# Patient Record
Sex: MALE | Race: WHITE | Employment: OTHER | URBAN - NONMETROPOLITAN AREA
[De-identification: names, ages, dates, MRNs, and addresses within clinical notes are randomized per-mention and may not be internally consistent; named-entity substitution may affect disease eponyms.]

---

## 2017-07-29 ENCOUNTER — COMMUNICATION - GICH (OUTPATIENT)
Dept: PEDIATRICS | Facility: OTHER | Age: 72
End: 2017-07-29

## 2017-07-29 DIAGNOSIS — E78.5 HYPERLIPIDEMIA: ICD-10-CM

## 2017-10-25 ENCOUNTER — COMMUNICATION - GICH (OUTPATIENT)
Dept: INTERNAL MEDICINE | Facility: OTHER | Age: 72
End: 2017-10-25

## 2017-10-25 ENCOUNTER — AMBULATORY - GICH (OUTPATIENT)
Dept: FAMILY MEDICINE | Facility: OTHER | Age: 72
End: 2017-10-25

## 2017-10-25 DIAGNOSIS — Z23 ENCOUNTER FOR IMMUNIZATION: ICD-10-CM

## 2017-10-25 DIAGNOSIS — E78.5 HYPERLIPIDEMIA: ICD-10-CM

## 2017-10-30 ENCOUNTER — COMMUNICATION - GICH (OUTPATIENT)
Dept: SURGERY | Facility: OTHER | Age: 72
End: 2017-10-30

## 2017-12-13 ENCOUNTER — AMBULATORY - GICH (OUTPATIENT)
Dept: SCHEDULING | Facility: OTHER | Age: 72
End: 2017-12-13

## 2017-12-28 NOTE — TELEPHONE ENCOUNTER
Patient Information     Patient Name MRN Wiliam Hodges [id number] Male 1945      Telephone Encounter by Chyna Morales at 10/25/2017  9:17 AM     Author:  Chyna Morales Service:  (none) Author Type:  (none)     Filed:  10/25/2017  9:23 AM Encounter Date:  10/25/2017 Status:  Signed     :  Chyna Morales            Patient stopped in to the [facility] today for a flu shot. He also requested a refill on his Simvastatin, as he only has three left and zero refills available. I advised him to call his pharmacy to send a refill request, but our eRx pool is quite backed up, so I told the patient I would check with his doctor about a refill. I did notify him that he is probably due for labs and a check up, as well.     Last Lipids:  Chol: 10/18/2016 133  TG: 10/18/2016 143  HDL: 10/18/2016 44   LDL: 10/18/2016 60    Last appointment with the provider for this encounter  Last visit with ABBY XIAO was on: 10/18/2016 in [facility]    [place]. Please let him know if the refill is done today, otherwise he will call his pharmacy on Saturday.    Chyna Morales CMA(AAMA)  ..................10/25/2017   9:20 AM

## 2018-01-01 ENCOUNTER — OFFICE VISIT (OUTPATIENT)
Dept: PEDIATRICS | Facility: OTHER | Age: 73
End: 2018-01-01
Attending: INTERNAL MEDICINE
Payer: MEDICARE

## 2018-01-01 ENCOUNTER — ALLIED HEALTH/NURSE VISIT (OUTPATIENT)
Dept: FAMILY MEDICINE | Facility: OTHER | Age: 73
End: 2018-01-01
Attending: INTERNAL MEDICINE
Payer: MEDICARE

## 2018-01-01 VITALS
BODY MASS INDEX: 31.83 KG/M2 | HEIGHT: 72 IN | SYSTOLIC BLOOD PRESSURE: 124 MMHG | DIASTOLIC BLOOD PRESSURE: 68 MMHG | HEART RATE: 66 BPM | WEIGHT: 235 LBS

## 2018-01-01 DIAGNOSIS — J30.2 SEASONAL ALLERGIC RHINITIS, UNSPECIFIED TRIGGER: ICD-10-CM

## 2018-01-01 DIAGNOSIS — E66.09 NON MORBID OBESITY DUE TO EXCESS CALORIES: ICD-10-CM

## 2018-01-01 DIAGNOSIS — Z23 NEED FOR VACCINATION: ICD-10-CM

## 2018-01-01 DIAGNOSIS — E78.2 MIXED HYPERLIPIDEMIA: Primary | ICD-10-CM

## 2018-01-01 DIAGNOSIS — Z23 NEED FOR PROPHYLACTIC VACCINATION AND INOCULATION AGAINST INFLUENZA: Primary | ICD-10-CM

## 2018-01-01 DIAGNOSIS — N40.1 BENIGN PROSTATIC HYPERPLASIA WITH LOWER URINARY TRACT SYMPTOMS, SYMPTOM DETAILS UNSPECIFIED: ICD-10-CM

## 2018-01-01 DIAGNOSIS — N52.9 ERECTILE DYSFUNCTION, UNSPECIFIED ERECTILE DYSFUNCTION TYPE: ICD-10-CM

## 2018-01-01 DIAGNOSIS — K57.30 DIVERTICULAR DISEASE OF COLON: ICD-10-CM

## 2018-01-01 DIAGNOSIS — Z12.11 SPECIAL SCREENING FOR MALIGNANT NEOPLASMS, COLON: ICD-10-CM

## 2018-01-01 DIAGNOSIS — Z13.1 SCREENING FOR DIABETES MELLITUS: ICD-10-CM

## 2018-01-01 DIAGNOSIS — E78.5 HYPERLIPIDEMIA, UNSPECIFIED HYPERLIPIDEMIA TYPE: Primary | ICD-10-CM

## 2018-01-01 LAB
ANION GAP SERPL CALCULATED.3IONS-SCNC: 6 MMOL/L (ref 3–14)
BUN SERPL-MCNC: 17 MG/DL (ref 7–25)
CALCIUM SERPL-MCNC: 9 MG/DL (ref 8.6–10.3)
CHLORIDE SERPL-SCNC: 103 MMOL/L (ref 98–107)
CHOLEST SERPL-MCNC: 137 MG/DL
CO2 SERPL-SCNC: 31 MMOL/L (ref 21–31)
CREAT SERPL-MCNC: 1.14 MG/DL (ref 0.7–1.3)
GFR SERPL CREATININE-BSD FRML MDRD: 63 ML/MIN/1.7M2
GLUCOSE SERPL-MCNC: 100 MG/DL (ref 70–105)
HDLC SERPL-MCNC: 40 MG/DL (ref 23–92)
LDLC SERPL CALC-MCNC: 71 MG/DL
NONHDLC SERPL-MCNC: 97 MG/DL
POTASSIUM SERPL-SCNC: 4.4 MMOL/L (ref 3.5–5.1)
PSA SERPL-MCNC: 0.85 NG/ML
SODIUM SERPL-SCNC: 140 MMOL/L (ref 134–144)
TRIGL SERPL-MCNC: 130 MG/DL

## 2018-01-01 PROCEDURE — G0009 ADMIN PNEUMOCOCCAL VACCINE: HCPCS

## 2018-01-01 PROCEDURE — 36415 COLL VENOUS BLD VENIPUNCTURE: CPT | Performed by: INTERNAL MEDICINE

## 2018-01-01 PROCEDURE — G0463 HOSPITAL OUTPT CLINIC VISIT: HCPCS

## 2018-01-01 PROCEDURE — 99397 PER PM REEVAL EST PAT 65+ YR: CPT | Performed by: INTERNAL MEDICINE

## 2018-01-01 PROCEDURE — 84153 ASSAY OF PSA TOTAL: CPT | Mod: GZ | Performed by: INTERNAL MEDICINE

## 2018-01-01 PROCEDURE — 80048 BASIC METABOLIC PNL TOTAL CA: CPT | Performed by: INTERNAL MEDICINE

## 2018-01-01 PROCEDURE — 90662 IIV NO PRSV INCREASED AG IM: CPT

## 2018-01-01 PROCEDURE — 80061 LIPID PANEL: CPT | Performed by: INTERNAL MEDICINE

## 2018-01-01 PROCEDURE — 90471 IMMUNIZATION ADMIN: CPT

## 2018-01-01 PROCEDURE — 90732 PPSV23 VACC 2 YRS+ SUBQ/IM: CPT | Performed by: INTERNAL MEDICINE

## 2018-01-01 RX ORDER — SIMVASTATIN 40 MG
40 TABLET ORAL AT BEDTIME
Qty: 90 TABLET | Refills: 3 | Status: SHIPPED | OUTPATIENT
Start: 2018-01-01

## 2018-01-01 RX ORDER — SILDENAFIL 50 MG/1
50 TABLET, FILM COATED ORAL DAILY PRN
COMMUNITY
Start: 2015-10-12 | End: 2018-01-01

## 2018-01-01 RX ORDER — SIMVASTATIN 40 MG
TABLET ORAL
Qty: 90 TABLET | OUTPATIENT
Start: 2018-01-01

## 2018-01-01 RX ORDER — FEXOFENADINE HCL 180 MG/1
1 TABLET ORAL DAILY PRN
COMMUNITY
End: 2018-01-01

## 2018-01-01 RX ORDER — SIMVASTATIN 40 MG
40 TABLET ORAL AT BEDTIME
Qty: 90 TABLET | Refills: 0 | Status: SHIPPED | OUTPATIENT
Start: 2018-01-01 | End: 2018-01-01

## 2018-01-01 RX ORDER — FEXOFENADINE HCL 180 MG/1
180 TABLET ORAL DAILY PRN
Qty: 30 TABLET | Refills: 11 | Status: SHIPPED | OUTPATIENT
Start: 2018-01-01

## 2018-01-01 RX ORDER — SILDENAFIL 100 MG/1
100 TABLET, FILM COATED ORAL DAILY PRN
Qty: 12 TABLET | Refills: 11 | Status: SHIPPED | OUTPATIENT
Start: 2018-01-01

## 2018-01-01 RX ORDER — SIMVASTATIN 40 MG
40 TABLET ORAL AT BEDTIME
Refills: 3 | COMMUNITY
Start: 2018-01-01 | End: 2018-01-01

## 2018-01-01 ASSESSMENT — PAIN SCALES - GENERAL: PAINLEVEL: MILD PAIN (3)

## 2018-07-23 NOTE — PROGRESS NOTES
Patient Information     Patient Name  Wiliam Dominguez MRN  [id number] Sex  Male   1945      Letter by Scanner at      Author:  Scanner Service:  (none) Author Type:  (none)    Filed:   Encounter Date:  2017 Status:  (Other)           Wiliam Dominguez  [address]  [facility] [address]          January 3, 2018    Dear Mr. Dominguez:    Your colon FIT testing was negative/normal.    If you have any questions or concerns, please call the clinic at [phone].    Signed, Beto Leung MD  Internal Medicine & Pediatrics

## 2018-07-24 NOTE — PROGRESS NOTES
Patient Information     Patient Name  Wiliam Dominguez MRN  [id number] Sex  Male   1945      Letter by Anette Vieyra at      Author:  Anette Vieyra Service:  (none) Author Type:  (none)    Filed:   Encounter Date:  10/30/2017 Status:  (Other)           Wiliam Dominguez  [address]  [facility] [address]          2017    Dear Mr. Dominguez:    It has come to our attention that you are due for a colonoscopy.  According to our records, your last colonoscopy was done on 07.  It  is time for your repeat colonoscopy.  Please contact the Surgery department at [phone] and we will be happy to set up this colonoscopy for you.  If you have had a colonoscopy since your last one with us, please let us know so we can update our records.      Sincerely,       Anette IBARRA LPN  General Surgery      Reviewed and electronically signed by provider.

## 2018-10-24 NOTE — MR AVS SNAPSHOT
"              After Visit Summary   10/24/2018    Wiliam Dominguez    MRN: [id number]           Patient Information     Date Of Birth          1945        Visit Information        Provider Department      10/24/2018 11:15 AM Nurse, [facility]        Today's Diagnoses     Need for prophylactic vaccination and inoculation against influenza    -  1       Follow-ups after your visit        Your next 10 appointments already scheduled     Oct 24, 2018 11:15 AM CDT   Nurse Only with [facility]   [facility]    [address]  [place] [address] [phone]              Who to contact     If you have questions or need follow up information about today's clinic visit or your schedule please contact [facility] directly at [phone].  Normal or non-critical lab and imaging results will be communicated to you by NeuralStemhart, letter or phone within 4 business days after the clinic has received the results. If you do not hear from us within 7 days, please contact the clinic through NeuralStemhart or phone. If you have a critical or abnormal lab result, we will notify you by phone as soon as possible.  Submit refill requests through Playnomics or call your pharmacy and they will forward the refill request to us. Please allow 3 business days for your refill to be completed.          Additional Information About Your Visit        MyChart Information     Playnomics lets you send messages to your doctor, view your test results, renew your prescriptions, schedule appointments and more. To sign up, go to [email or web address] . Click on \"Log in\" on the left side of the screen, which will take you to the Welcome page. Then click on \"Sign up Now\" on the right side of the page.     You will be asked to enter the access code listed below, as well as some personal information. Please follow the directions to create your username and password.     Your access " code is: [email or web address]  Expires: 2019 10:06 AM     Your access code will  in 90 days. If you need help or a new code, please call your [place] clinic or [phone].        Care EveryWhere ID     This is your Care EveryWhere ID. This could be used by other organizations to access your [place] medical records  [id number]         Blood Pressure from Last 3 Encounters:   10/18/16 134/82   16 125/71   10/28/15 134/74    Weight from Last 3 Encounters:   10/18/16 229 lb 6.4 oz (104.1 kg)   16 231 lb (104.8 kg)   10/28/15 230 lb 3.2 oz (104.4 kg)              We Performed the Following     HC FLU VACCINE, INCREASED ANTIGEN, PRESV FREE     Vaccine Administration, Initial [88971]        Primary Care Provider Office Phone # Fax #    Beto Esteban Leung [phone]       [address] [facility] [address]        Equal Access to Services     [facility]: Hadii aad ku hadasho Soomaali, waaxda luqadaha, qaybta kaalmada adeegyada, waxrudy fowler hayvitaly holloway . So [facility] [phone].    ATENCIÓN: Si habla español, tiene a cervantes disposición servicios gratuitos de asistencia lingüística. Llame al [phone].    We comply with applicable federal civil rights laws and [place] laws. We do not discriminate on the basis of race, color, national origin, age, disability, sex, sexual orientation, or gender identity.            Thank you!     Thank you for choosing [facility]  for your care. Our goal is always to provide you with excellent care. Hearing back from our patients is one way we can continue to improve our services. Please take a few minutes to complete the written survey that you may receive in the mail after your visit with us. Thank you!             Your Updated Medication List - Protect others around you: Learn how to safely use, store and throw away your medicines at www.disposemymeds.org.      Notice  As of 10/24/2018 10:06 AM    You have not  been prescribed any medications.

## 2018-10-24 NOTE — PROGRESS NOTES

## 2018-11-14 PROBLEM — K57.30 DIVERTICULAR DISEASE OF COLON: Status: ACTIVE | Noted: 2018-01-01

## 2018-11-14 PROBLEM — R39.15 URGENCY OF URINATION: Status: ACTIVE | Noted: 2018-01-01

## 2018-11-14 PROBLEM — T78.40XA ALLERGY: Status: ACTIVE | Noted: 2018-01-01

## 2018-11-14 NOTE — TELEPHONE ENCOUNTER
"Patient stopped in yesterday to ask about getting a refill of his Simvastatin and we told him to contact his pharmacy. It has been a while since he has been seen or had his blood drawn, so we recommended he schedule an appointment for a physical, which he did for 12/3/18. He went to his pharmacy and they told him \"no, you need to be seen\", but I don't see that they ever contacted our clinic with a request.   Would you fill this at least with enough to get by until he is seen?  He is going to be out of medicine for a few of days because he is leaving town today for [employer], but will be back on Monday and would need to restart this before his appointment. He is aware to come in fasting on 12/3/18.  Chyna Morales CMA([facility])..................11/14/2018   10:53 AM     "

## 2018-12-03 PROBLEM — E78.2 MIXED HYPERLIPIDEMIA: Chronic | Status: ACTIVE | Noted: 2018-01-01

## 2018-12-03 PROBLEM — E78.2 MIXED HYPERLIPIDEMIA: Status: ACTIVE | Noted: 2018-01-01

## 2018-12-03 PROBLEM — R39.15 URGENCY OF URINATION: Status: RESOLVED | Noted: 2018-01-01 | Resolved: 2018-01-01

## 2018-12-03 NOTE — LETTER
December 4, 2018      Wiliam Dominguez  [address] [address]        Dear ,    We are writing to inform you of your test results.    Labs look great!    Resulted Orders   Basic metabolic panel   Result Value Ref Range    Sodium 140 134 - 144 mmol/L    Potassium 4.4 3.5 - 5.1 mmol/L    Chloride 103 98 - 107 mmol/L    Carbon Dioxide 31 21 - 31 mmol/L    Anion Gap 6 3 - 14 mmol/L    Glucose 100 70 - 105 mg/dL    Urea Nitrogen 17 7 - 25 mg/dL    Creatinine 1.14 0.70 - 1.30 mg/dL    GFR Estimate 63 >60 mL/min/1.7m2    GFR Estimate If Black 76 >60 mL/min/1.7m2    Calcium 9.0 8.6 - 10.3 mg/dL   Lipid Profile   Result Value Ref Range    Cholesterol 137 <200 mg/dL    Triglycerides 130 <150 mg/dL    HDL Cholesterol 40 23 - 92 mg/dL    LDL Cholesterol Calculated 71 <100 mg/dL      Comment:      Desirable:       <100 mg/dl    Non HDL Cholesterol 97 <130 mg/dL   Prostate Specific Antigen GH   Result Value Ref Range    Prostate Specific Antigen 0.848 <3.100 ng/mL       If you have any questions or concerns, please call the clinic at the number listed above.       Sincerely,        Beto Leung MD

## 2018-12-03 NOTE — NURSING NOTE
Patient presents to clinic for medication review.  Is fasting this morning.  Yuridia Olea LPN ....................  12/3/2018   8:55 AM    No LMP for male patient.  Medication Reconciliation: complete    Yuridia Olea LPN  12/3/2018 8:55 AM

## 2018-12-03 NOTE — MR AVS SNAPSHOT
After Visit Summary   12/3/2018    Wiliam Dominguez    MRN: [id number]           Patient Information     Date Of Birth          1945        Visit Information        Provider Department      12/3/2018 9:00 AM Beto Leung MD [facility]        Today's Diagnoses     Mixed hyperlipidemia    -  1    Special screening for malignant neoplasms, colon        Non morbid obesity due to excess calories        Diverticular disease of colon        Seasonal allergic rhinitis, unspecified trigger        Erectile dysfunction, unspecified erectile dysfunction type        Benign prostatic hyperplasia with lower urinary tract symptoms, symptom details unspecified        Screening for diabetes mellitus        Need for vaccination          Care Instructions     -- Pneumonia 23 today   -- Shingles vaccine at your pharmacy or [facility]   -- Cologuard annually   -- If sildenafil ineffective, would refer to Urology          Follow-ups after your visit        Future tests that were ordered for you today     Open Future Orders        Priority Expected Expires Ordered    Basic metabolic panel Routine  12/4/2019 12/3/2018    Lipid Profile Routine  12/4/2019 12/3/2018    Prostate Specific Antigen GH Routine  12/4/2019 12/3/2018            Who to contact     If you have questions or need follow up information about today's clinic visit or your schedule please contact [facility] directly at [phone].  Normal or non-critical lab and imaging results will be communicated to you by MyChart, letter or phone within 4 business days after the clinic has received the results. If you do not hear from us within 7 days, please contact the clinic through MyChart or phone. If you have a critical or abnormal lab result, we will notify you by phone as soon as possible.  Submit refill requests through Flowity or call your pharmacy and they will forward the refill request to us. Please allow 3  business days for your refill to be completed.          Additional Information About Your Visit        Care EveryWhere ID     This is your Care EveryWhere ID. This could be used by other organizations to access your [place] medical records  [id number]        Your Vitals Were     Pulse Height BMI (Body Mass Index)             66 6' (1.829 m) 31.87 kg/m2          Blood Pressure from Last 3 Encounters:   12/03/18 124/68   10/18/16 134/82   07/01/16 125/71    Weight from Last 3 Encounters:   12/03/18 235 lb (106.6 kg)   10/18/16 229 lb 6.4 oz (104.1 kg)   07/01/16 231 lb (104.8 kg)              We Performed the Following     Pneumococcal vaccine 23 valent PPSV23  (Pneumovax) [81652]          Today's Medication Changes          These changes are accurate as of 12/3/18  9:37 AM.  If you have any questions, ask your nurse or doctor.               These medicines have changed or have updated prescriptions.        Dose/Directions    fexofenadine 180 MG tablet   Commonly known as:  ALLEGRA   This may have changed:  reasons to take this   Used for:  Seasonal allergic rhinitis, unspecified trigger   Changed by:  Beto Leung MD        Dose:  180 mg   Take 1 tablet (180 mg) by mouth daily as needed for allergies Uses in the Spring   Quantity:  30 tablet   Refills:  11       sildenafil 100 MG tablet   Commonly known as:  VIAGRA   This may have changed:    - medication strength  - how much to take  - reasons to take this   Used for:  Erectile dysfunction, unspecified erectile dysfunction type   Changed by:  Beto Leung MD        Dose:  100 mg   Take 1 tablet (100 mg) by mouth daily as needed (erectile dysfunction)   Quantity:  12 tablet   Refills:  11            Where to get your medicines      These medications were sent to [id number] - [place] - [address]  [address] [place] [address]     Phone:  [phone]     fexofenadine 180 MG tablet     sildenafil 100 MG tablet    simvastatin 40 MG tablet                Primary Care Provider Office Phone # Fax #    Beto Esteban Leung [phone] [address]    [facility] [address]        Equal Access to Services     YVETTE JADE : [place] peralta christiano Lopez, waaxda luqadaha, qaybta kaalmada adegladys, joy toddsharri byrdchico garcia amie bruce. So [facility] [phone].    ATENCIÓN: Si habla español, tiene a cervantes disposición servicios gratuitos de asistencia lingüística. Llame al [phone].    We comply with applicable federal civil rights laws and [place] laws. We do not discriminate on the basis of race, color, national origin, age, disability, sex, sexual orientation, or gender identity.            Thank you!     Thank you for choosing [facility] AND [facility]  for your care. Our goal is always to provide you with excellent care. Hearing back from our patients is one way we can continue to improve our services. Please take a few minutes to complete the written survey that you may receive in the mail after your visit with us. Thank you!             Your Updated Medication List - Protect others around you: Learn how to safely use, store and throw away your medicines at www.disposemymeds.org.          This list is accurate as of 12/3/18  9:37 AM.  Always use your most recent med list.                   Brand Name Dispense Instructions for use Diagnosis    fexofenadine 180 MG tablet    ALLEGRA    30 tablet    Take 1 tablet (180 mg) by mouth daily as needed for allergies Uses in the Spring    Seasonal allergic rhinitis, unspecified trigger       sildenafil 100 MG tablet    VIAGRA    12 tablet    Take 1 tablet (100 mg) by mouth daily as needed (erectile dysfunction)    Erectile dysfunction, unspecified erectile dysfunction type       simvastatin 40 MG tablet    ZOCOR    90 tablet    Take 1 tablet (40 mg) by mouth At Bedtime    Mixed hyperlipidemia

## 2018-12-03 NOTE — PATIENT INSTRUCTIONS
-- Pneumonia 23 today   -- Shingles vaccine at your pharmacy or [facility]   -- Cologuard annually   -- If sildenafil ineffective, would refer to Urology

## 2018-12-04 NOTE — PROGRESS NOTES
Subjective  Wiliam Dominguez is a 73 year old male who presents for annual physical.  No major concerns or questions.  History of hyperlipidemia which is stable with simvastatin.  History of erectile dysfunction which was inadequately treated with sildenafil 50 mg.  History of allergic rhinosinusitis and stable with infrequent use of Allegra.  No chest pains with exertion.  No change in urinary symptoms including no nocturia.    Problem List/PMH: reviewed in EMR, and made relevant updates today.  Medications: reviewed in EMR, and made relevant updates today.  Allergies: reviewed in EMR, and made relevant updates today.    Social Hx:  Social History   Substance Use Topics     Smoking status: Never Smoker     Smokeless tobacco: Never Used     Alcohol use Yes      Comment: Alcoholic Drinks/day: rare social     Social History     Social History Narrative    Worked as an .  Wife (Elizabeth) is a tired nurse  Two children.   Building a cabin on his property.     I reviewed social history and made relevant updates today.    Family Hx:   Family History   Problem Relation Age of Onset     Other - See Comments Mother      knee arthroplasty/back pain. /dementia,  at 84     Other - See Comments Father      noncancerous colon polyps found/colon polyps     Heart Disease Father      Heart Disease, from CHF at age 90     Family History Negative Brother      Good Health     Family History Negative Sister      Good Health     Anesthesia Reaction No family hx of      Anesthesia Problem     Blood Disease No family hx of      Blood Disease     Colon Cancer No family hx of      Cancer-colon     Prostate Cancer No family hx of      Cancer-prostate       Objective  Vitals: reviewed in EMR.  /68 (BP Location: Right arm, Patient Position: Sitting, Cuff Size: Adult Large)  Pulse 66  Ht 6' (1.829 m)  Wt 235 lb (106.6 kg)  BMI 31.87 kg/m2    Gen: Pleasant male, NAD.  HEENT: MMM, no OP erythema.   Neck: Supple, no JVD,  no bruits.  CV: RRR no m/r/g.   Pulm: CTAB no w/r/r  GI: Soft, NT, ND. No HSM. No masses. Bowel sounds present.  Neuro: Grossly intact  Msk: No lower extremity edema.  Skin: No concerning lesions.  Psychiatric: Normal affect and insight. Does not appear anxious or depressed.    Labs:  Lab Results   Component Value Date    HGB 14.2 10/03/2013    HCT 41.5 10/03/2013     10/03/2013    CHOL 137 12/03/2018    TRIG 130 12/03/2018    HDL 40 12/03/2018     12/03/2018    BUN 17 12/03/2018    CO2 31 12/03/2018         Assessment    ICD-10-CM    1. Mixed hyperlipidemia E78.2 simvastatin (ZOCOR) 40 MG tablet     Lipid Profile     Lipid Profile   2. Special screening for malignant neoplasms, colon Z12.11    3. Non morbid obesity due to excess calories E66.09    4. Diverticular disease of colon K57.30    5. Seasonal allergic rhinitis, unspecified trigger J30.2 fexofenadine (ALLEGRA) 180 MG tablet   6. Erectile dysfunction, unspecified erectile dysfunction type N52.9 sildenafil (VIAGRA) 100 MG tablet   7. Benign prostatic hyperplasia with lower urinary tract symptoms, symptom details unspecified N40.1 Prostate Specific Antigen GH     Prostate Specific Antigen GH   8. Screening for diabetes mellitus Z13.1 Basic metabolic panel     Basic metabolic panel   9. Need for vaccination Z23 Pneumococcal vaccine 23 valent PPSV23  (Pneumovax) [19487]     Orders Placed This Encounter   Procedures     Pneumococcal vaccine 23 valent PPSV23  (Pneumovax) [42513]     Basic metabolic panel     Lipid Profile     Prostate Specific Antigen GH       Plan   -- Expected clinical course discussed   -- Medications and their side effects discussed  Patient Instructions    -- Pneumonia 23 today   -- Shingles vaccine at your pharmacy or [facility]   -- Cologuard annually   -- If sildenafil ineffective, would refer to Urology        Signed, Beto Leung MD  Internal Medicine & Pediatrics

## 2019-01-01 ENCOUNTER — APPOINTMENT (OUTPATIENT)
Dept: CT IMAGING | Facility: OTHER | Age: 74
End: 2019-01-01
Attending: EMERGENCY MEDICINE
Payer: COMMERCIAL

## 2019-01-01 ENCOUNTER — HOSPITAL ENCOUNTER (EMERGENCY)
Facility: OTHER | Age: 74
Discharge: ANOTHER HOSPITAL | End: 2019-06-16
Attending: FAMILY MEDICINE | Admitting: FAMILY MEDICINE
Payer: COMMERCIAL

## 2019-01-01 VITALS
BODY MASS INDEX: 30.48 KG/M2 | DIASTOLIC BLOOD PRESSURE: 90 MMHG | RESPIRATION RATE: 21 BRPM | SYSTOLIC BLOOD PRESSURE: 136 MMHG | TEMPERATURE: 96 F | WEIGHT: 225 LBS | OXYGEN SATURATION: 99 % | HEIGHT: 72 IN | HEART RATE: 57 BPM

## 2019-01-01 DIAGNOSIS — I71.30 RUPTURED ABDOMINAL AORTIC ANEURYSM (AAA) (H): ICD-10-CM

## 2019-01-01 LAB
ABO + RH BLD: NORMAL
ABO + RH BLD: NORMAL
ALBUMIN SERPL-MCNC: 3.3 G/DL (ref 3.5–5.7)
ALBUMIN UR-MCNC: 30 MG/DL
ALP SERPL-CCNC: 42 U/L (ref 34–104)
ALT SERPL W P-5'-P-CCNC: 14 U/L (ref 7–52)
ANION GAP SERPL CALCULATED.3IONS-SCNC: 9 MMOL/L (ref 3–14)
APPEARANCE UR: CLEAR
AST SERPL W P-5'-P-CCNC: 13 U/L (ref 13–39)
BASOPHILS # BLD AUTO: 0 10E9/L (ref 0–0.2)
BASOPHILS NFR BLD AUTO: 0.6 %
BILIRUB SERPL-MCNC: 0.8 MG/DL (ref 0.3–1)
BILIRUB UR QL STRIP: NEGATIVE
BLD GP AB SCN SERPL QL: NORMAL
BLD PROD TYP BPU: NORMAL
BLD UNIT ID BPU: 0
BLOOD BANK CMNT PATIENT-IMP: NORMAL
BLOOD PRODUCT CODE: NORMAL
BPU ID: NORMAL
BUN SERPL-MCNC: 23 MG/DL (ref 7–25)
CALCIUM SERPL-MCNC: 8.3 MG/DL (ref 8.6–10.3)
CHLORIDE SERPL-SCNC: 106 MMOL/L (ref 98–107)
CO2 SERPL-SCNC: 24 MMOL/L (ref 21–31)
COLOR UR AUTO: YELLOW
CREAT SERPL-MCNC: 1.12 MG/DL (ref 0.7–1.3)
CRP SERPL-MCNC: 0.4 MG/L
DIFFERENTIAL METHOD BLD: ABNORMAL
EOSINOPHIL # BLD AUTO: 0.2 10E9/L (ref 0–0.7)
EOSINOPHIL NFR BLD AUTO: 3.9 %
ERYTHROCYTE [DISTWIDTH] IN BLOOD BY AUTOMATED COUNT: 13.9 % (ref 10–15)
GFR SERPL CREATININE-BSD FRML MDRD: 64 ML/MIN/{1.73_M2}
GLUCOSE SERPL-MCNC: 156 MG/DL (ref 70–105)
GLUCOSE UR STRIP-MCNC: NEGATIVE MG/DL
HCT VFR BLD AUTO: 32.3 % (ref 40–53)
HGB BLD-MCNC: 10.9 G/DL (ref 13.3–17.7)
HGB UR QL STRIP: ABNORMAL
IMM GRANULOCYTES # BLD: 0 10E9/L (ref 0–0.4)
IMM GRANULOCYTES NFR BLD: 0.2 %
KETONES UR STRIP-MCNC: NEGATIVE MG/DL
LEUKOCYTE ESTERASE UR QL STRIP: NEGATIVE
LIPASE SERPL-CCNC: 8 U/L (ref 11–82)
LYMPHOCYTES # BLD AUTO: 1.8 10E9/L (ref 0.8–5.3)
LYMPHOCYTES NFR BLD AUTO: 36 %
MCH RBC QN AUTO: 32.4 PG (ref 26.5–33)
MCHC RBC AUTO-ENTMCNC: 33.7 G/DL (ref 31.5–36.5)
MCV RBC AUTO: 96 FL (ref 78–100)
MONOCYTES # BLD AUTO: 0.5 10E9/L (ref 0–1.3)
MONOCYTES NFR BLD AUTO: 10.4 %
MUCOUS THREADS #/AREA URNS LPF: PRESENT /LPF
NEUTROPHILS # BLD AUTO: 2.4 10E9/L (ref 1.6–8.3)
NEUTROPHILS NFR BLD AUTO: 48.9 %
NITRATE UR QL: NEGATIVE
NON-SQ EPI CELLS #/AREA URNS LPF: ABNORMAL /LPF
NUM BPU REQUESTED: 4
PH UR STRIP: 5.5 PH (ref 5–9)
PLATELET # BLD AUTO: 201 10E9/L (ref 150–450)
POTASSIUM SERPL-SCNC: 3.9 MMOL/L (ref 3.5–5.1)
PROT SERPL-MCNC: 5.4 G/DL (ref 6.4–8.9)
RBC # BLD AUTO: 3.36 10E12/L (ref 4.4–5.9)
RBC #/AREA URNS AUTO: ABNORMAL /HPF
SODIUM SERPL-SCNC: 139 MMOL/L (ref 134–144)
SOURCE: ABNORMAL
SP GR UR STRIP: 1.02 (ref 1–1.03)
SPECIMEN EXP DATE BLD: NORMAL
TRANSFUSION STATUS PATIENT QL: NORMAL
UROBILINOGEN UR STRIP-ACNC: 0.2 EU/DL (ref 0.2–1)
WBC # BLD AUTO: 4.9 10E9/L (ref 4–11)
WBC #/AREA URNS AUTO: ABNORMAL /HPF

## 2019-01-01 PROCEDURE — P9059 PLASMA, FRZ BETWEEN 8-24HOUR: HCPCS | Performed by: FAMILY MEDICINE

## 2019-01-01 PROCEDURE — 25500064 ZZH RX 255 OP 636: Performed by: EMERGENCY MEDICINE

## 2019-01-01 PROCEDURE — P9016 RBC LEUKOCYTES REDUCED: HCPCS | Performed by: FAMILY MEDICINE

## 2019-01-01 PROCEDURE — 86920 COMPATIBILITY TEST SPIN: CPT | Mod: 91 | Performed by: FAMILY MEDICINE

## 2019-01-01 PROCEDURE — 83690 ASSAY OF LIPASE: CPT | Performed by: EMERGENCY MEDICINE

## 2019-01-01 PROCEDURE — 36430 TRANSFUSION BLD/BLD COMPNT: CPT | Performed by: FAMILY MEDICINE

## 2019-01-01 PROCEDURE — 86140 C-REACTIVE PROTEIN: CPT | Performed by: EMERGENCY MEDICINE

## 2019-01-01 PROCEDURE — 99291 CRITICAL CARE FIRST HOUR: CPT | Mod: Z6 | Performed by: FAMILY MEDICINE

## 2019-01-01 PROCEDURE — 93010 ELECTROCARDIOGRAM REPORT: CPT | Performed by: INTERNAL MEDICINE

## 2019-01-01 PROCEDURE — 86900 BLOOD TYPING SEROLOGIC ABO: CPT | Performed by: FAMILY MEDICINE

## 2019-01-01 PROCEDURE — 86920 COMPATIBILITY TEST SPIN: CPT | Performed by: FAMILY MEDICINE

## 2019-01-01 PROCEDURE — 36415 COLL VENOUS BLD VENIPUNCTURE: CPT | Performed by: EMERGENCY MEDICINE

## 2019-01-01 PROCEDURE — 93005 ELECTROCARDIOGRAM TRACING: CPT

## 2019-01-01 PROCEDURE — 86850 RBC ANTIBODY SCREEN: CPT | Performed by: FAMILY MEDICINE

## 2019-01-01 PROCEDURE — 81001 URINALYSIS AUTO W/SCOPE: CPT | Performed by: EMERGENCY MEDICINE

## 2019-01-01 PROCEDURE — 85025 COMPLETE CBC W/AUTO DIFF WBC: CPT | Performed by: EMERGENCY MEDICINE

## 2019-01-01 PROCEDURE — 40000275 ZZH STATISTIC RCP TIME EA 10 MIN

## 2019-01-01 PROCEDURE — 99291 CRITICAL CARE FIRST HOUR: CPT | Mod: 25 | Performed by: FAMILY MEDICINE

## 2019-01-01 PROCEDURE — 36415 COLL VENOUS BLD VENIPUNCTURE: CPT | Performed by: FAMILY MEDICINE

## 2019-01-01 PROCEDURE — 36430 TRANSFUSION BLD/BLD COMPNT: CPT

## 2019-01-01 PROCEDURE — 74174 CTA ABD&PLVS W/CONTRAST: CPT

## 2019-01-01 PROCEDURE — 25000128 H RX IP 250 OP 636: Performed by: EMERGENCY MEDICINE

## 2019-01-01 PROCEDURE — 96374 THER/PROPH/DIAG INJ IV PUSH: CPT | Mod: XU | Performed by: FAMILY MEDICINE

## 2019-01-01 PROCEDURE — 40000344 ZZHCL STATISTIC THAWING COMPONENT: Performed by: FAMILY MEDICINE

## 2019-01-01 PROCEDURE — 80053 COMPREHEN METABOLIC PANEL: CPT | Performed by: EMERGENCY MEDICINE

## 2019-01-01 PROCEDURE — 86901 BLOOD TYPING SEROLOGIC RH(D): CPT | Performed by: FAMILY MEDICINE

## 2019-01-01 RX ORDER — IODIXANOL 320 MG/ML
100 INJECTION, SOLUTION INTRAVASCULAR ONCE
Status: COMPLETED | OUTPATIENT
Start: 2019-01-01 | End: 2019-01-01

## 2019-01-01 RX ORDER — HYDROMORPHONE HYDROCHLORIDE 1 MG/ML
0.5 INJECTION, SOLUTION INTRAMUSCULAR; INTRAVENOUS; SUBCUTANEOUS ONCE
Status: COMPLETED | OUTPATIENT
Start: 2019-01-01 | End: 2019-01-01

## 2019-01-01 RX ADMIN — HYDROMORPHONE HYDROCHLORIDE 0.5 MG: 1 INJECTION, SOLUTION INTRAMUSCULAR; INTRAVENOUS; SUBCUTANEOUS at 06:58

## 2019-01-01 RX ADMIN — IODIXANOL 100 ML: 320 INJECTION, SOLUTION INTRAVASCULAR at 07:05

## 2019-01-01 RX ADMIN — SODIUM CHLORIDE 1000 ML: 9 INJECTION, SOLUTION INTRAVENOUS at 06:52

## 2019-01-01 ASSESSMENT — ENCOUNTER SYMPTOMS
ABDOMINAL PAIN: 1
ABDOMINAL DISTENTION: 1

## 2019-01-01 ASSESSMENT — MIFFLIN-ST. JEOR: SCORE: 1793.59

## 2019-06-16 NOTE — ED NOTES
[facility] here at 0740.  MD giving report.  4th unit of blood being hung now.  Pt now on 100% non-re breather1.  Etco2 25.  sats 99%.

## 2019-06-16 NOTE — ED NOTES
BP dropped to 99/65 after fluids slowed.  Pt. In Trendelenberg.  Now 77/50.  O2 at 92% with 5 liters of O2.

## 2019-06-16 NOTE — ED PROVIDER NOTES
History     Chief Complaint   Patient presents with     Back Pain     Abdominal Pain     HPI  Wiliam Dominguez is a 74 year old male who was woken from sleep this morning with severe lower abdominal and back pain.  He says it is the same all the way across either side.  Not worse one area more than the other.  He is diaphoretic.  No trauma that he can think of.  Began a few hours prior to arrival.  He is feeling dizzy and lightheaded as well.  He did wake up and urinate but did not go very much.  Normal bowel movement last night.  Patient came in just prior to change of shift.  I briefly evaluated the patient, then placed orders to get him to CT scan emergently as I am concerned for possible aortic emergency.  Very brief exam showed quiet bowel sounds, he is quite firm and is abdomen diffusely tender throughout the lower abdomen.  Care patient be turned over to Dr. York.    Allergies:  Allergies   Allergen Reactions     Iodine Hives     MRI 10 years ago approximately       Problem List:    Patient Active Problem List    Diagnosis Date Noted     Mixed hyperlipidemia 12/03/2018     Priority: Medium     Allergy 11/14/2018     Priority: Medium     Overview:   Seasonal       Diverticular disease of colon 11/14/2018     Priority: Medium     Psychosexual dysfunction with inhibited sexual excitement 11/14/2018     Priority: Medium     Benign prostatic hyperplasia 10/18/2016     Priority: Medium     Non morbid obesity due to excess calories 10/18/2016     Priority: Medium     Thrombus 01/15/2014     Priority: Medium     Prostate nodule 10/03/2013     Priority: Medium        Past Medical History:    Past Medical History:   Diagnosis Date     Dorsopathy      Other tear of medial meniscus, current injury, unspecified knee, initial encounter        Past Surgical History:    Past Surgical History:   Procedure Laterality Date     ARTHROSCOPY KNEE      Bilateral knees     BIOPSY PROSTATE TRANSRECTAL      2009,benign, Desi  [facility]     COLONOSCOPY      2007,Follow-up recommended in ten years.     SIGMOIDOSCOPY FLEXIBLE      89,Normal. Recommended f/up screening 5 yrs.     SIGMOIDOSCOPY FLEXIBLE      ,Sigmoidoscopy and colon x-ray       Family History:    Family History   Problem Relation Age of Onset     Other - See Comments Mother         knee arthroplasty/back pain. /dementia,  at 84     Other - See Comments Father         noncancerous colon polyps found/colon polyps     Heart Disease Father         Heart Disease, from CHF at age 90     Family History Negative Brother         Good Health     Family History Negative Sister         Good Health     Anesthesia Reaction No family hx of         Anesthesia Problem     Blood Disease No family hx of         Blood Disease     Colon Cancer No family hx of         Cancer-colon     Prostate Cancer No family hx of         Cancer-prostate       Social History:  Marital Status:   [2]  Social History     Tobacco Use     Smoking status: Never Smoker     Smokeless tobacco: Never Used   Substance Use Topics     Alcohol use: Yes     Comment: Alcoholic Drinks/day: rare social     Drug use: Unknown     Types: Other     Comment: Drug use: No        Medications:      simvastatin (ZOCOR) 40 MG tablet   fexofenadine (ALLEGRA) 180 MG tablet   sildenafil (VIAGRA) 100 MG tablet         Review of Systems   Gastrointestinal: Positive for abdominal distention and abdominal pain.       Physical Exam   BP: 118/85  Pulse: 54  Heart Rate: 53  Temp: 94.2  F (34.6  C)  Resp: 27  Height: 182.9 cm (6')  Weight: 102.1 kg (225 lb)  SpO2: 97 %      Physical Exam   Constitutional: He appears well-developed and well-nourished. He appears distressed.   Pale diaphoretic elderly male returning from CT with sudden hypotension and near syncope.  Placed in trendelenburg and 1 liter of crystalloid infused with massive transfusion protocol started.   HENT:   Head: Normocephalic and atraumatic.   Eyes: Pupils  are equal, round, and reactive to light. EOM are normal. No scleral icterus.   Cardiovascular: Regular rhythm.   bradycardia   Pulmonary/Chest: Effort normal and breath sounds normal. No respiratory distress.   Abdominal: He exhibits distension. There is tenderness.   Musculoskeletal: He exhibits no edema.   Neurological: He is alert.   Skin: He is diaphoretic. There is pallor.   Nursing note and vitals reviewed.      ED Course        Procedures            Critical Care 70 minutes.       Results for orders placed or performed during the hospital encounter of 06/16/19 (from the past 24 hour(s))   CTA Angiogram Abdomen/Pelvis    Narrative    PROCEDURE: CTA ABDOMEN PELVIS WITH CONTRAST 6/16/2019 7:08 AM    HISTORY: Abdominal rigidity    COMPARISONS: None.    Meds/Dose Given: 100 mL visipaque 320    TECHNIQUE: CT angiogram of the abdomen and pelvis with sagittal  coronal MIP reconstructions    FINDINGS: There is a 10 cm infrarenal abdominal aortic aneurysm which  is ruptured. The celiac superior mesenteric arteries appear normal.  There is heavy atherosclerotic plaquing at the origin of the right  renal artery with a 70% stenosis. The left renal artery is widely  patent. There is aspect plaquing in both common iliac arteries which  are nonaneurysmal.    The lung bases are clear. The liver is free of masses or biliary  ductal enlargement. The spleen and pancreas appear normal. The adrenal  glands are normal. The right left kidneys are free of hydronephrosis  there are parapelvic cysts seen bilaterally. There is extensive  retroperitoneal hemorrhage noted it extends on the left down to the  inguinal region. No free blood within the abdomen is seen. There are  no intraperitoneal masses. The bladder and rectum appear normal.  Advanced degenerative changes are present in the lumbar spine.         Impression    IMPRESSION: Ruptured 10 cm infrarenal abdominal aortic aneurysm. This  result was called to emergency room at 7:10  AM    PAPI GONZALEZ MD   CBC with platelets differential   Result Value Ref Range    WBC 4.9 4.0 - 11.0 10e9/L    RBC Count 3.36 (L) 4.4 - 5.9 10e12/L    Hemoglobin 10.9 (L) 13.3 - 17.7 g/dL    Hematocrit 32.3 (L) 40.0 - 53.0 %    MCV 96 78 - 100 fl    MCH 32.4 26.5 - 33.0 pg    MCHC 33.7 31.5 - 36.5 g/dL    RDW 13.9 10.0 - 15.0 %    Platelet Count 201 150 - 450 10e9/L    Diff Method Automated Method     % Neutrophils 48.9 %    % Lymphocytes 36.0 %    % Monocytes 10.4 %    % Eosinophils 3.9 %    % Basophils 0.6 %    % Immature Granulocytes 0.2 %    Absolute Neutrophil 2.4 1.6 - 8.3 10e9/L    Absolute Lymphocytes 1.8 0.8 - 5.3 10e9/L    Absolute Monocytes 0.5 0.0 - 1.3 10e9/L    Absolute Eosinophils 0.2 0.0 - 0.7 10e9/L    Absolute Basophils 0.0 0.0 - 0.2 10e9/L    Abs Immature Granulocytes 0.0 0 - 0.4 10e9/L   Comprehensive metabolic panel   Result Value Ref Range    Sodium 139 134 - 144 mmol/L    Potassium 3.9 3.5 - 5.1 mmol/L    Chloride 106 98 - 107 mmol/L    Carbon Dioxide 24 21 - 31 mmol/L    Anion Gap 9 3 - 14 mmol/L    Glucose 156 (H) 70 - 105 mg/dL    Urea Nitrogen 23 7 - 25 mg/dL    Creatinine 1.12 0.70 - 1.30 mg/dL    GFR Estimate 64 >60 mL/min/[1.73_m2]    GFR Estimate If Black 77 >60 mL/min/[1.73_m2]    Calcium 8.3 (L) 8.6 - 10.3 mg/dL    Bilirubin Total 0.8 0.3 - 1.0 mg/dL    Albumin 3.3 (L) 3.5 - 5.7 g/dL    Protein Total 5.4 (L) 6.4 - 8.9 g/dL    Alkaline Phosphatase 42 34 - 104 U/L    ALT 14 7 - 52 U/L    AST 13 13 - 39 U/L   Lipase   Result Value Ref Range    Lipase 8 (L) 11 - 82 U/L   CRP inflammation   Result Value Ref Range    CRP Inflammation 0.4 <0.5 mg/L   Blood component   Result Value Ref Range    Unit Number [id number]     Blood Component Type Plasma, Thawed     Division Number 00     Status of Unit Released to care unit 06/16/2019 0734     Blood Product Code D8753S81     Unit Status ISS    Blood component   Result Value Ref Range    Unit Number [id number]     Blood Component Type  Plasma, Thawed     Division Number 00     Status of Unit Released to care unit 06/16/2019 0736     Blood Product Code N1077O09     Unit Status ISS        Medications   tranexamic acid (CYKLOKAPRON) infusion 1 g (has no administration in time range)   0.9% sodium chloride BOLUS (1,000 mLs Intravenous New Bag 6/16/19 0652)   HYDROmorphone (PF) (DILAUDID) injection 0.5 mg (0.5 mg Intravenous Given 6/16/19 0658)   iodixanol (VISIPAQUE 320) injection 100 mL (100 mLs Intravenous Given 6/16/19 0705)     7:38 AM I discussed patient with Dr. Stewart, Cardiothoracic Surgery accepting patient for Air transfer and will go directly to OR on arrival.    7:43 AM air ambulance loading patient on their cart.  Patient is completing his 4th unit of O- PRBC and is starting his 1st FFP unit.  He is alert and conversant and understands he is going to emergency surgery.  Airway intact and not needing intubation at this time.    7:58 AM aircare leaving; we were able to give them second unit of FFP just prior to lift off for transfusion enroute.    Assessments & Plan (with Medical Decision Making)   74 year old male with acute onset of abdominal pain this morning with diaphoresis and hypotension/bradycardia with CT confirming acute AAA rupture: 10cm infrarenal ruptured aneurysm.  Air transfer to [facility].       Addendum: Patient's wife had requested that I inform their son Duane: [phone] and I updated him regarding his father's serious life-threatening emergency.  He will review with his sister Zahida ([phone]).  Dante York MD.    I have reviewed the nursing notes.    I have reviewed the findings, diagnosis, plan and need for follow up with the patient.         Medication List      There are no discharge medications for this visit.         Final diagnoses:   Ruptured abdominal aortic aneurysm (AAA) (H)       6/16/2019   [facility] AND [facility]     Pastor Herring MD  06/16/19 0655       Dante York  MD [place]  06/16/19 0749       Dante York MD  06/16/19 0758       Dante York MD  06/16/19 0800       Dante York MD  06/16/19 0814

## 2019-06-16 NOTE — ED NOTES
MD  Nima in to see patient.  IVF increased.  RN bagging patient.  Pt. Responsive. IVF with 3rd blood infusing.

## 2019-06-16 NOTE — ED NOTES
Unit #1 began infusing now..  3rd IV being inserted.  [facility] has been called.  ETA 7:35. Mahoney 16 F placed without difficuklty.  Temp 95.6 tympanic. TXA being infused 1 gram IV.  Chante jean placed.

## 2019-06-16 NOTE — ED NOTES
[facility] at bedside, took over transfusions.  Pt. Received a total of 4 units of PRBC's, while in the ED.  Pressure cuffs used at 300mmHg, along with Range fluid warmer for rapid infusions.

## 2019-06-16 NOTE — ED TRIAGE NOTES
Pt comes in by private car with wife. Reports sudden onset of lower back pain and lower abdominal pain a few hours ago. It woke him up. He feels lightheaded and dizzy. He felt fine when he went to bed. Denies difficulty urinating, had a normal BM last night. He then clarifies that he does not feel like he emptied his bladder all the way this morning.

## 2022-10-04 PROBLEM — F52.8 OTHER SEXUAL DYSFUNCTION NOT DUE TO A SUBSTANCE OR KNOWN PHYSIOLOGICAL CONDITION: Status: ACTIVE | Noted: 2018-01-01

## (undated) RX ORDER — SODIUM CHLORIDE 9 MG/ML
INJECTION, SOLUTION INTRAVENOUS
Status: DISPENSED
Start: 2019-01-01

## (undated) RX ORDER — HYDROMORPHONE HYDROCHLORIDE 1 MG/ML
INJECTION, SOLUTION INTRAMUSCULAR; INTRAVENOUS; SUBCUTANEOUS
Status: DISPENSED
Start: 2019-01-01